# Patient Record
Sex: FEMALE | Race: WHITE
[De-identification: names, ages, dates, MRNs, and addresses within clinical notes are randomized per-mention and may not be internally consistent; named-entity substitution may affect disease eponyms.]

---

## 2019-04-11 ENCOUNTER — HOSPITAL ENCOUNTER (EMERGENCY)
Dept: HOSPITAL 25 - UCCORT | Age: 34
Discharge: HOME | End: 2019-04-11
Payer: COMMERCIAL

## 2019-04-11 VITALS — SYSTOLIC BLOOD PRESSURE: 124 MMHG | DIASTOLIC BLOOD PRESSURE: 81 MMHG

## 2019-04-11 DIAGNOSIS — Z91.018: ICD-10-CM

## 2019-04-11 DIAGNOSIS — B34.9: Primary | ICD-10-CM

## 2019-04-11 DIAGNOSIS — G43.909: ICD-10-CM

## 2019-04-11 DIAGNOSIS — Z91.013: ICD-10-CM

## 2019-04-11 DIAGNOSIS — E03.9: ICD-10-CM

## 2019-04-11 LAB
FLUAV RNA SPEC QL NAA+PROBE: NEGATIVE
FLUBV RNA SPEC QL NAA+PROBE: NEGATIVE

## 2019-04-11 PROCEDURE — 99211 OFF/OP EST MAY X REQ PHY/QHP: CPT

## 2019-04-11 PROCEDURE — G0463 HOSPITAL OUTPT CLINIC VISIT: HCPCS

## 2019-04-11 PROCEDURE — 87651 STREP A DNA AMP PROBE: CPT

## 2019-04-11 NOTE — UC
Throat Pain/Nasal Stephen HPI





- HPI Summary


HPI Summary: 





Onset of rigors, headache, cough and malaise yesterday morning. Concerned that 

this might be strep; symptoms are also suggestive of flu. No vomiting or 

diarrhea. 





- History of Current Complaint


Chief Complaint: UCRespiratory


Stated Complaint: COUGH,SORE THROAT


Time Seen by Provider: 04/11/19 16:41


Hx Obtained From: Patient, Family/Caretaker


Hx Last Menstrual Period: 4/3/19


Onset/Duration: Sudden Onset, Lasting Days - 2


Severity: Moderate


Pain Intensity: 6


Cough: Nonproductive


Associated Signs & Symptoms: Positive: Dysphagia, Hoarseness





- Epiglottits Risk Factors


Epiglottis Risk Factors: Negative





- Allergies/Home Medications


Allergies/Adverse Reactions: 


 Allergies











Allergy/AdvReac Type Severity Reaction Status Date / Time


 


shellfish derived Allergy  GI Upset Verified 04/11/19 16:32


 


poultry Allergy  Itching Uncoded 11/20/17 13:16











Home Medications: 


 Home Medications





Aspirin/Acetaminophen/Caffeine [Excedrin Migraine Caplet] 2 each PO ONCE PRN 04/ 11/19 [History Confirmed 04/11/19]


Levonorgestrel (Iud) [Mirena IUD] 20 mcg IU ONCE 04/11/19 [History Confirmed 04/ 11/19]











PMH/Surg Hx/FS Hx/Imm Hx


Previously Healthy: Yes


Endocrine History: Thyroid Disease - thyroid goiter, Hypothyroidism


Neurological History: Migraine





- Surgical History


Surgical History: Yes


Surgery Procedure, Year, and Place: tonsillectomy, neck lumpectomy, appy, 

wisdom teeth extraction, polypectomy





- Family History


Known Family History: Positive: Cardiac Disease, Diabetes, Other - hypothroidism





- Social History


Occupation: Employed Full-time


Lives: Alone


Alcohol Use: Occasionally


Substance Use Type: None


Smoking Status (MU): Never Smoked Tobacco





Review of Systems


All Other Systems Reviewed And Are Negative: Yes


Constitutional: Positive: Fever, Fatigue


ENT: Positive: Sore Throat


Respiratory: Positive: Cough


Cardiovascular: Positive: Negative


Gastrointestinal: Positive: Negative


Genitourinary: Positive: Negative


Motor: Positive: Negative


Neurovascular: Positive: Negative


Musculoskeletal: Positive: Myalgia


Neurological: Positive: Headache - has had 2 migraines this week.


Psychological: Positive: Negative





Physical Exam


Triage Information Reviewed: Yes


Appearance: No Pain Distress, Ill-Appearing


Vital Signs: 


 Initial Vital Signs











Temp  100 F   04/11/19 16:34


 


Pulse  89   04/11/19 16:34


 


Resp  17   04/11/19 16:34


 


BP  124/81   04/11/19 16:34


 


Pulse Ox  100   04/11/19 16:34











Eyes: Positive: Conjunctiva Clear


ENT: Positive: Pharyngeal erythema - past tonsillectomy, TM dull - right serous 

fluid


Dental Exam: Normal


Neck exam: Other - thyroid diffusely enlarged.


Neck: Positive: Supple, Nontender, No Lymphadenopathy


Respiratory: Positive: Lungs clear, Normal breath sounds


Cardiovascular: Positive: RRR, No Murmur


Musculoskeletal Exam: Normal


Neurological: Positive: Alert, Muscle Tone Normal


Psychological Exam: Normal


Skin Exam: Normal





Throat Pain/Nasal Course/Dx





- Course


Course Of Treatment: 





symptomatic treatment of viral illness. Follow up if not improving





- Differential Dx/Diagnosis


Differential Diagnosis/HQI/PQRI: Influenza, Laryngitis, URI, Other - strep


Provider Diagnosis: 


 Viral syndrome








Discharge





- Sign-Out/Discharge


Documenting (check all that apply): Patient Departure


All imaging exams completed and their final reports reviewed: No Studies





- Discharge Plan


Condition: Stable


Disposition: HOME


Patient Education Materials:  Viral Syndrome (ED)


Referrals: 


Scotty Schmitt MD [Primary Care Provider] - 


Additional Instructions: 


Clinically there is no evidence of bacterial infection. Both flu and strep 

tests are negative. 


Rest at home, using ibuprofen or acetaminophen to control the fever and aches. 


Follow up here or with your primary care doctor if you have fever persisting 

beyond the 13th of April or if you develop new symptoms. 





- Billing Disposition and Condition


Condition: STABLE


Disposition: Home

## 2019-04-14 ENCOUNTER — HOSPITAL ENCOUNTER (EMERGENCY)
Dept: HOSPITAL 25 - UCCORT | Age: 34
Discharge: HOME | End: 2019-04-14
Payer: COMMERCIAL

## 2019-04-14 VITALS — SYSTOLIC BLOOD PRESSURE: 114 MMHG | DIASTOLIC BLOOD PRESSURE: 78 MMHG

## 2019-04-14 DIAGNOSIS — Z91.018: ICD-10-CM

## 2019-04-14 DIAGNOSIS — E03.9: ICD-10-CM

## 2019-04-14 DIAGNOSIS — H57.89: ICD-10-CM

## 2019-04-14 DIAGNOSIS — J01.00: Primary | ICD-10-CM

## 2019-04-14 DIAGNOSIS — K82.9: ICD-10-CM

## 2019-04-14 DIAGNOSIS — Z91.013: ICD-10-CM

## 2019-04-14 DIAGNOSIS — R50.9: ICD-10-CM

## 2019-04-14 PROCEDURE — G0463 HOSPITAL OUTPT CLINIC VISIT: HCPCS

## 2019-04-14 PROCEDURE — 99212 OFFICE O/P EST SF 10 MIN: CPT

## 2019-04-14 NOTE — UC
Throat Pain/Nasal Stephen HPI





- HPI Summary


HPI Summary: 


33-year-old female presents with persistent nasal congestion, sinus pain, 

postnasal drip, and productive cough.  She was seen at this facility on 4/11/ 2019 for similar symptoms and diagnosed with viral illness.  States since that 

time she has continued to have intermittent fever and chills.  Reports green 

nasal drainage, green drainage from her left eye with erythema, and a 

productive cough or sputum.  Denies ear pain, sore throat, chest pain, 

shortness of breath, abdominal pain, nausea, or vomiting.








- History of Current Complaint


Chief Complaint: UCGeneralIllness


Stated Complaint: SINUS CONGESTION


Time Seen by Provider: 04/14/19 08:07


Hx Obtained From: Patient


Hx Last Menstrual Period: 4/3/19


Pain Intensity: 6





- Allergies/Home Medications


Allergies/Adverse Reactions: 


 Allergies











Allergy/AdvReac Type Severity Reaction Status Date / Time


 


shellfish derived Allergy  GI Upset Verified 04/11/19 16:32


 


poultry Allergy  Itching Uncoded 11/20/17 13:16











Home Medications: 


 Home Medications





Ursodiol 250 mg PO TID 04/14/19 [History Confirmed 04/14/19]











PMH/Surg Hx/FS Hx/Imm Hx


Endocrine History: Hypothyroidism


GI/ History: Gall Bladder Disease





- Surgical History


Surgical History: Yes


Surgery Procedure, Year, and Place: tonsillectomy, neck lumpectomy, appy, 

wisdom teeth extraction, polypectomy





- Family History


Known Family History: Positive: Cardiac Disease, Diabetes, Other - hypothroidism





- Social History


Occupation: Employed Part-time


Lives: With Family


Alcohol Use: Occasionally


Substance Use Type: None


Smoking Status (MU): Never Smoked Tobacco





Review of Systems


All Other Systems Reviewed And Are Negative: Yes


Constitutional: Positive: Fever, Chills, Fatigue


Skin: Negative: Rash


Eyes: Positive: Drainage, Eye Redness


ENT: Positive: Nasal Discharge, Sinus Congestion, Sinus Pain/Tenderness.  

Negative: Sore Throat, Ear Ache


Respiratory: Positive: Cough.  Negative: Shortness Of Breath


Cardiovascular: Negative: Palpitations, Chest Pain


Gastrointestinal: Negative: Abdominal Pain, Vomiting, Diarrhea, Nausea


Genitourinary: Positive: Negative


Musculoskeletal: Positive: Negative


Neurological: Positive: Negative


Is Patient Immunocompromised?: No





Physical Exam





- Summary


Physical Exam Summary: 


GENERAL APPEARANCE: Well developed, well nourished, alert and cooperative, and 

appears to be in no acute distress.





EYES: Conjunctiva clear. No drainage.





EARS: External auditory canals and tympanic membranes clear, hearing grossly 

intact.





NOSE: Moderate nasal congestion with mucosal erythema and edema.





THROAT: Pharyngeal cobblestoning. Tonsils surgically absent. Uvula midline. 

Oral cavity normal. Teeth and gingiva in good general condition.





NECK: Neck supple, non-tender without lymphadenopathy.





CARDIAC: Normal S1 and S2. No S3, S4 or murmurs. Rhythm is regular. There is no 

peripheral edema, cyanosis or pallor. Extremities are warm and well perfused. 

Capillary refill is less than 2 seconds. Peripheral pulses intact.





LUNGS: Clear to auscultation without rales, rhonchi, wheezing or diminished 

breath sounds. Non-productive cough.





ABDOMEN: Positive bowel sounds. Soft, nondistended, nontender. No guarding or 

rebound. No masses or hepatosplenomegally.





MUSKULOSKELETAL: ROM intact to all extremities. No joint erythema or 

tenderness. Normal muscular development. Normal gait.





SKIN: Skin normal color, texture and turgor with no lesions or eruptions.





Triage Information Reviewed: Yes


Vital Signs: 


 Initial Vital Signs











Temp  98.3 F   04/14/19 07:48


 


Pulse  88   04/14/19 07:48


 


Resp  16   04/14/19 07:48


 


BP  114/78   04/14/19 07:48


 


Pulse Ox  98   04/14/19 07:48











Vital Signs Reviewed: Yes





Throat Pain/Nasal Course/Dx





- Course


Course Of Treatment: 


33-year-old female presents with persistent nasal congestion, sinus pain, 

postnasal drip, and productive cough.  She was seen at this facility on 4/11/ 2019 for similar symptoms and diagnosed with viral illness.  States since that 

time she has continued to have intermittent fever and chills.  Reports green 

nasal drainage, green drainage from her left eye with erythema, and a 

productive cough or sputum.  Denies ear pain, sore throat, chest pain, 

shortness of breath, abdominal pain, nausea, or vomiting.  Afebrile.  Vital 

signs stable.  Exam remarkable for moderate nasal congestion with mucosal 

erythema and edema, pharyngeal cobblestoning, and a nonproductive cough.  Given 

the worsening of her symptoms and the persistent fever I will treat her for an 

acute sinusitis with Augmentin 875 mg twice a day 10 days as well as 

symptomatic treatment.  She is to follow-up with her primary care provider in 5 

days.  Anticipatory guidance and warning symptoms were reviewed with the 

patient.  Verbalized understanding and agrees with plan of care.








- Differential Dx/Diagnosis


Differential Diagnosis/HQI/PQRI: Influenza, Pharyngitis, Sinusitis, URI


Provider Diagnosis: 


 Acute maxillary sinusitis








Discharge





- Sign-Out/Discharge


Documenting (check all that apply): Patient Departure


All imaging exams completed and their final reports reviewed: No Studies





- Discharge Plan


Condition: Stable


Disposition: HOME


Prescriptions: 


Amoxicillin/Clavulanate TAB* [Augmentin *] 875 mg PO BID #20 tab


Fluticasone NASAL SPRAY 50MCG* [Flonase NASAL SPRAY 50MCG*] 2 spray BOTH NARES 

DAILY #1 btl


Patient Education Materials:  Sinusitis (ED)


Referrals: 


Scotty Schmitt MD [Primary Care Provider] - 


Additional Instructions: 


Your history and exam are consistent with a sinus infection. With the worsening 

of your symptoms we will treat you with an antibiotic.





Start Augmentin 875 mg 1 tab twice a day for 10 days. Take with food to avoid 

upset stomach. Be sure to complete the entire course even if feeling better.





Drink plenty of fluids to avoid dehydration especially if you are running any 

fever.





Use a saline rinse kit such as Neti Pot or NeilMed at least twice a day to help 

thin secretions and promote drainage of the sinuses.





Use fluticasone (Flonase) nasal spray 2 sprays each nostril once daily.





Use an over the counter decongestant such as Sudafed according to directions to 

help with congestion.





Take over the counter acetaminophen (Tylenol) or ibuprofen (Advil, Motrin) 

according to directions as needed for pain or fever.





Follow up with your primary care provider in 5 days.





Seek immediate medical attention in the emergency room if you have fever 

greater than 100.5 F despite taking acetaminophen or ibuprofen, have chest pain

, difficulty breathing, are unable to swallow, or have any worsening of 

symptoms.





- Billing Disposition and Condition


Condition: STABLE


Disposition: Home





- Attestation Statements


Provider Attestation: 





Per institutional requirements, I have reviewed the chart, however, I was not 

consulted specifically or made aware of this patient by the  midlevel provider.

  I did not personally evaluate, interact with , or disposition  this patient.